# Patient Record
Sex: MALE | Race: WHITE | NOT HISPANIC OR LATINO | URBAN - METROPOLITAN AREA
[De-identification: names, ages, dates, MRNs, and addresses within clinical notes are randomized per-mention and may not be internally consistent; named-entity substitution may affect disease eponyms.]

---

## 2022-09-01 ENCOUNTER — INPATIENT (INPATIENT)
Facility: HOSPITAL | Age: 57
LOS: 0 days | Discharge: HOME | End: 2022-09-02
Attending: HOSPITALIST | Admitting: HOSPITALIST

## 2022-09-01 VITALS
TEMPERATURE: 99 F | HEART RATE: 84 BPM | SYSTOLIC BLOOD PRESSURE: 134 MMHG | HEIGHT: 67 IN | OXYGEN SATURATION: 99 % | RESPIRATION RATE: 18 BRPM | DIASTOLIC BLOOD PRESSURE: 77 MMHG | WEIGHT: 154.98 LBS

## 2022-09-01 LAB
ALBUMIN SERPL ELPH-MCNC: 4.8 G/DL — SIGNIFICANT CHANGE UP (ref 3.5–5.2)
ALP SERPL-CCNC: 54 U/L — SIGNIFICANT CHANGE UP (ref 30–115)
ALT FLD-CCNC: 11 U/L — SIGNIFICANT CHANGE UP (ref 0–41)
ANION GAP SERPL CALC-SCNC: 11 MMOL/L — SIGNIFICANT CHANGE UP (ref 7–14)
AST SERPL-CCNC: 21 U/L — SIGNIFICANT CHANGE UP (ref 0–41)
BASOPHILS # BLD AUTO: 0 K/UL — SIGNIFICANT CHANGE UP (ref 0–0.2)
BASOPHILS NFR BLD AUTO: 0 % — SIGNIFICANT CHANGE UP (ref 0–1)
BILIRUB DIRECT SERPL-MCNC: 0.2 MG/DL — SIGNIFICANT CHANGE UP (ref 0–0.3)
BILIRUB INDIRECT FLD-MCNC: 4.2 MG/DL — HIGH (ref 0.2–1.2)
BILIRUB SERPL-MCNC: 4.4 MG/DL — HIGH (ref 0.2–1.2)
BUN SERPL-MCNC: 17 MG/DL — SIGNIFICANT CHANGE UP (ref 10–20)
BURR CELLS BLD QL SMEAR: PRESENT — SIGNIFICANT CHANGE UP
CALCIUM SERPL-MCNC: 9.5 MG/DL — SIGNIFICANT CHANGE UP (ref 8.5–10.1)
CHLORIDE SERPL-SCNC: 103 MMOL/L — SIGNIFICANT CHANGE UP (ref 98–110)
CO2 SERPL-SCNC: 26 MMOL/L — SIGNIFICANT CHANGE UP (ref 17–32)
CREAT SERPL-MCNC: 1.1 MG/DL — SIGNIFICANT CHANGE UP (ref 0.7–1.5)
DACRYOCYTES BLD QL SMEAR: SLIGHT — SIGNIFICANT CHANGE UP
EGFR: 79 ML/MIN/1.73M2 — SIGNIFICANT CHANGE UP
EOSINOPHIL # BLD AUTO: 0 K/UL — SIGNIFICANT CHANGE UP (ref 0–0.7)
EOSINOPHIL NFR BLD AUTO: 0 % — SIGNIFICANT CHANGE UP (ref 0–8)
GLUCOSE SERPL-MCNC: 117 MG/DL — HIGH (ref 70–99)
HCT VFR BLD CALC: 45.8 % — SIGNIFICANT CHANGE UP (ref 42–52)
HGB BLD-MCNC: 16 G/DL — SIGNIFICANT CHANGE UP (ref 14–18)
LIDOCAIN IGE QN: 19 U/L — SIGNIFICANT CHANGE UP (ref 7–60)
LYMPHOCYTES # BLD AUTO: 0.54 K/UL — LOW (ref 1.2–3.4)
LYMPHOCYTES # BLD AUTO: 4.3 % — LOW (ref 20.5–51.1)
MAGNESIUM SERPL-MCNC: 1.9 MG/DL — SIGNIFICANT CHANGE UP (ref 1.8–2.4)
MANUAL SMEAR VERIFICATION: SIGNIFICANT CHANGE UP
MCHC RBC-ENTMCNC: 30.6 PG — SIGNIFICANT CHANGE UP (ref 27–31)
MCHC RBC-ENTMCNC: 34.9 G/DL — SIGNIFICANT CHANGE UP (ref 32–37)
MCV RBC AUTO: 87.6 FL — SIGNIFICANT CHANGE UP (ref 80–94)
MICROCYTES BLD QL: SLIGHT — SIGNIFICANT CHANGE UP
MONOCYTES # BLD AUTO: 0.88 K/UL — HIGH (ref 0.1–0.6)
MONOCYTES NFR BLD AUTO: 7 % — SIGNIFICANT CHANGE UP (ref 1.7–9.3)
NEUTROPHILS # BLD AUTO: 11.21 K/UL — HIGH (ref 1.4–6.5)
NEUTROPHILS NFR BLD AUTO: 81.7 % — HIGH (ref 42.2–75.2)
NEUTS BAND # BLD: 7 % — HIGH (ref 0–6)
OVALOCYTES BLD QL SMEAR: SLIGHT — SIGNIFICANT CHANGE UP
PLAT MORPH BLD: ABNORMAL
PLATELET # BLD AUTO: 237 K/UL — SIGNIFICANT CHANGE UP (ref 130–400)
POIKILOCYTOSIS BLD QL AUTO: SLIGHT — SIGNIFICANT CHANGE UP
POLYCHROMASIA BLD QL SMEAR: SLIGHT — SIGNIFICANT CHANGE UP
POTASSIUM SERPL-MCNC: 4.7 MMOL/L — SIGNIFICANT CHANGE UP (ref 3.5–5)
POTASSIUM SERPL-SCNC: 4.7 MMOL/L — SIGNIFICANT CHANGE UP (ref 3.5–5)
PROT SERPL-MCNC: 7.1 G/DL — SIGNIFICANT CHANGE UP (ref 6–8)
RBC # BLD: 5.23 M/UL — SIGNIFICANT CHANGE UP (ref 4.7–6.1)
RBC # FLD: 12.2 % — SIGNIFICANT CHANGE UP (ref 11.5–14.5)
RBC BLD AUTO: ABNORMAL
SODIUM SERPL-SCNC: 140 MMOL/L — SIGNIFICANT CHANGE UP (ref 135–146)
TROPONIN T SERPL-MCNC: <0.01 NG/ML — SIGNIFICANT CHANGE UP
WBC # BLD: 12.64 K/UL — HIGH (ref 4.8–10.8)
WBC # FLD AUTO: 12.64 K/UL — HIGH (ref 4.8–10.8)

## 2022-09-01 PROCEDURE — 99285 EMERGENCY DEPT VISIT HI MDM: CPT

## 2022-09-01 PROCEDURE — 93010 ELECTROCARDIOGRAM REPORT: CPT

## 2022-09-01 RX ORDER — SODIUM CHLORIDE 9 MG/ML
1000 INJECTION INTRAMUSCULAR; INTRAVENOUS; SUBCUTANEOUS ONCE
Refills: 0 | Status: COMPLETED | OUTPATIENT
Start: 2022-09-01 | End: 2022-09-01

## 2022-09-01 RX ADMIN — SODIUM CHLORIDE 1000 MILLILITER(S): 9 INJECTION INTRAMUSCULAR; INTRAVENOUS; SUBCUTANEOUS at 20:25

## 2022-09-01 NOTE — ED PROVIDER NOTE - OBJECTIVE STATEMENT
57 y.o. ,M, denies pmh here for eval of episode of nausea, abdominal cramping, lightheadedness / diaphoresis from 2-3 pm. [place] [facility] [place] covid test neg. Reports eating a possibly spoiled watermelon and having coffee aqt 11 am prior to pupil dialation  at 1pm. Denies hx of smoking, cp sob, palpitations, visual changes or paresthesias. Pt has been asxs since 3pm.

## 2022-09-01 NOTE — ED PROVIDER NOTE - PHYSICAL EXAMINATION
Physical Exam    Vital Signs: I have reviewed the initial vital signs.  Constitutional: well-nourished, appears stated age, no acute distress  Eyes: Conjunctiva pink, Sclera clear,   Cardiovascular: S1 and S2, regular rate, regular rhythm, well-perfused extremities, radial pulses equal and 2+, calves nonttp, equal in size  Respiratory: unlabored respiratory effort, speaking in full sentences, handling oral secretions,  clear to auscultation bilaterally no wheezing, rales and rhonchi  Gastrointestinal: soft, non-tender abdomen, no pulsatile mass, normal bowl sounds  Musculoskeletal: supple neck, no lower extremity edema, no midline tenderness, paraspinal tenderness, clavicular creptius, painful rom, moving all extreities appropriately, no gross bony deformities or swelling.  Integumentary: warm, dry, no rashes, lacerations,  Neurologic: awake, alert, cranial nerves II-XII grossly intact, extremities’ motor and sensory functions grossly intact, no nystagmus, tremors, fasciculations facial droop, no ataxia, no dysmetria

## 2022-09-01 NOTE — ED PROVIDER NOTE - CLINICAL SUMMARY MEDICAL DECISION MAKING FREE TEXT BOX
ed w/u showing leukocytosis w/ elevated bands. indirect bili also elevated. ruq sono w/o acute findings. will admit for further w/u, monitoring, treatment. pt clinically stable, nontoxic appearing.

## 2022-09-01 NOTE — ED PROVIDER NOTE - NS ED ATTENDING STATEMENT MOD
This was a shared visit with the SANCHO. I reviewed and verified the documentation and independently performed the documented:

## 2022-09-01 NOTE — ED PROVIDER NOTE - CARE PLAN
Principal Discharge DX:	Bandemia without diagnosis of specific infection  Secondary Diagnosis:	Total bilirubin, elevated   1

## 2022-09-01 NOTE — ED PROVIDER NOTE - INPATIENT RESIDENT/ACP NOTIFIED
[facility] Emergency Dept  [address] [address]  Phone: [phone]    Name:  Jennie L Clark Behr  Current Date: May 17, 2017  : 1977  MRN: [id number]   BHARAT: [id number]    Visit Date: 2017  Address: [address] [address]  Phone: [phone]    Primary Care Provider     Name: Wilfredo Soni MD    Phone: [phone]    The staff of [facility] would like to thank you for allowing us to assist you with your healthcare needs. The following includes patient education materials and information on how best to care for your illness/injury at home and when to see a physician. If you need to locate a Doctor or clinic close to you, please call the Doctor Referral Service at [phone]. The Service is available Monday through Thursday from 8 AM to 8 PM and  from 8 AM to 4 PM.    Patients Please Note: If further time off is required, or a medical clearance to return to work is required, it must be obtained through your primary physician.  Return to work clearances and extensions of \"Time-Off\" will not be given by the Emergency Department.     We hope that you leave our Emergency Department believing that we provided you with very good care.   Your Opinion Matters To Us  If you receive a patient satisfaction survey in the mail, please complete and return it in the postage-paid envelope.  We truly value and appreciate your feedback.  Emergency Department Care Providers   Physician:Gerard Eugene DO    Advanced Practice Provider:  No providers found     RN_________________ ED Tech__________________ Clerical_________________         MAR

## 2022-09-01 NOTE — ED ADULT TRIAGE NOTE - CHIEF COMPLAINT QUOTE
Patient complaining of dizziness, nausea, and diaphoresis after having eyes dilated at opthomologist office. Symptoms have since resolved.

## 2022-09-01 NOTE — ED PROVIDER NOTE - ATTENDING APP SHARED VISIT CONTRIBUTION OF CARE
56 yo m   pt presents for nausea, lightheadedness, wretching, lower abd cramping around 2-3pm.  pt states he ate bad food in the morning. sx resolved after 3pm. pt went to , covid test negative.  of note, pt went to optho today at 1pm, had pupils dilated but was doing well immediately after optho eval 56 yo m   pt presents for nausea, lightheadedness, wretching, lower abd cramping around 2-3pm.  pt states he ate bad food in the morning. sx resolved after 3pm. pt went to , covid test negative.  of note, pt went to optho today at 1pm, had pupils dilated but was doing well after optho eval and made it home OK. pt denies smoking hx, fhx early cad. pt states he works out regularly and denies change in exertional tolerance, CP on exertion, lightheadedness on exertion, SOB on exertion.     vss  gen- NAD, aaox3  card-rrr  lungs-ctab, no wheezing or rhonchi  abd-sntnd, no guarding or rebound  neuro- full str/sensation, cn ii-xii grossly intact, normal coordination and gait      well appearing, likely related to bad food, however, will get screening labs, ekg/trop for atypical acs r/o  pt low risk w/o specific cardiac rf and denies anginal equivalents

## 2022-09-02 VITALS
SYSTOLIC BLOOD PRESSURE: 113 MMHG | TEMPERATURE: 96 F | DIASTOLIC BLOOD PRESSURE: 72 MMHG | RESPIRATION RATE: 18 BRPM | HEART RATE: 73 BPM

## 2022-09-02 LAB
ALBUMIN SERPL ELPH-MCNC: 4.5 G/DL — SIGNIFICANT CHANGE UP (ref 3.5–5.2)
ALP SERPL-CCNC: 44 U/L — SIGNIFICANT CHANGE UP (ref 30–115)
ALT FLD-CCNC: 10 U/L — SIGNIFICANT CHANGE UP (ref 0–41)
ANION GAP SERPL CALC-SCNC: 7 MMOL/L — SIGNIFICANT CHANGE UP (ref 7–14)
APPEARANCE UR: CLEAR — SIGNIFICANT CHANGE UP
AST SERPL-CCNC: 19 U/L — SIGNIFICANT CHANGE UP (ref 0–41)
BASOPHILS # BLD AUTO: 0.03 K/UL — SIGNIFICANT CHANGE UP (ref 0–0.2)
BASOPHILS NFR BLD AUTO: 0.4 % — SIGNIFICANT CHANGE UP (ref 0–1)
BILIRUB DIRECT SERPL-MCNC: 0.2 MG/DL — SIGNIFICANT CHANGE UP (ref 0–0.3)
BILIRUB INDIRECT FLD-MCNC: 6 MG/DL — HIGH (ref 0.2–1.2)
BILIRUB SERPL-MCNC: 6.1 MG/DL — HIGH (ref 0.2–1.2)
BILIRUB SERPL-MCNC: 6.2 MG/DL — HIGH (ref 0.2–1.2)
BILIRUB UR-MCNC: NEGATIVE — SIGNIFICANT CHANGE UP
BUN SERPL-MCNC: 13 MG/DL — SIGNIFICANT CHANGE UP (ref 10–20)
CALCIUM SERPL-MCNC: 9.2 MG/DL — SIGNIFICANT CHANGE UP (ref 8.5–10.1)
CHLORIDE SERPL-SCNC: 106 MMOL/L — SIGNIFICANT CHANGE UP (ref 98–110)
CO2 SERPL-SCNC: 28 MMOL/L — SIGNIFICANT CHANGE UP (ref 17–32)
COLOR SPEC: SIGNIFICANT CHANGE UP
CREAT SERPL-MCNC: 1 MG/DL — SIGNIFICANT CHANGE UP (ref 0.7–1.5)
DIFF PNL FLD: NEGATIVE — SIGNIFICANT CHANGE UP
EGFR: 88 ML/MIN/1.73M2 — SIGNIFICANT CHANGE UP
EOSINOPHIL # BLD AUTO: 0.01 K/UL — SIGNIFICANT CHANGE UP (ref 0–0.7)
EOSINOPHIL NFR BLD AUTO: 0.1 % — SIGNIFICANT CHANGE UP (ref 0–8)
GLUCOSE SERPL-MCNC: 107 MG/DL — HIGH (ref 70–99)
GLUCOSE UR QL: NEGATIVE — SIGNIFICANT CHANGE UP
HCT VFR BLD CALC: 41.7 % — LOW (ref 42–52)
HGB BLD-MCNC: 14.5 G/DL — SIGNIFICANT CHANGE UP (ref 14–18)
IMM GRANULOCYTES NFR BLD AUTO: 0.1 % — SIGNIFICANT CHANGE UP (ref 0.1–0.3)
KETONES UR-MCNC: NEGATIVE — SIGNIFICANT CHANGE UP
LDH SERPL L TO P-CCNC: 147 U/L — SIGNIFICANT CHANGE UP (ref 50–242)
LEUKOCYTE ESTERASE UR-ACNC: NEGATIVE — SIGNIFICANT CHANGE UP
LYMPHOCYTES # BLD AUTO: 1.18 K/UL — LOW (ref 1.2–3.4)
LYMPHOCYTES # BLD AUTO: 17.4 % — LOW (ref 20.5–51.1)
MAGNESIUM SERPL-MCNC: 2.1 MG/DL — SIGNIFICANT CHANGE UP (ref 1.8–2.4)
MCHC RBC-ENTMCNC: 30 PG — SIGNIFICANT CHANGE UP (ref 27–31)
MCHC RBC-ENTMCNC: 34.8 G/DL — SIGNIFICANT CHANGE UP (ref 32–37)
MCV RBC AUTO: 86.3 FL — SIGNIFICANT CHANGE UP (ref 80–94)
MONOCYTES # BLD AUTO: 0.59 K/UL — SIGNIFICANT CHANGE UP (ref 0.1–0.6)
MONOCYTES NFR BLD AUTO: 8.7 % — SIGNIFICANT CHANGE UP (ref 1.7–9.3)
NEUTROPHILS # BLD AUTO: 4.96 K/UL — SIGNIFICANT CHANGE UP (ref 1.4–6.5)
NEUTROPHILS NFR BLD AUTO: 73.3 % — SIGNIFICANT CHANGE UP (ref 42.2–75.2)
NITRITE UR-MCNC: NEGATIVE — SIGNIFICANT CHANGE UP
NRBC # BLD: 0 /100 WBCS — SIGNIFICANT CHANGE UP (ref 0–0)
PH UR: 6 — SIGNIFICANT CHANGE UP (ref 5–8)
PLATELET # BLD AUTO: 229 K/UL — SIGNIFICANT CHANGE UP (ref 130–400)
POTASSIUM SERPL-MCNC: 4.9 MMOL/L — SIGNIFICANT CHANGE UP (ref 3.5–5)
POTASSIUM SERPL-SCNC: 4.9 MMOL/L — SIGNIFICANT CHANGE UP (ref 3.5–5)
PROT SERPL-MCNC: 6.3 G/DL — SIGNIFICANT CHANGE UP (ref 6–8)
PROT UR-MCNC: NEGATIVE — SIGNIFICANT CHANGE UP
RBC # BLD: 4.83 M/UL — SIGNIFICANT CHANGE UP (ref 4.7–6.1)
RBC # BLD: 4.83 M/UL — SIGNIFICANT CHANGE UP (ref 4.7–6.1)
RBC # FLD: 12.2 % — SIGNIFICANT CHANGE UP (ref 11.5–14.5)
RETICS #: 52.2 K/UL — SIGNIFICANT CHANGE UP (ref 25–125)
RETICS/RBC NFR: 1.1 % — SIGNIFICANT CHANGE UP (ref 0.5–1.5)
SARS-COV-2 RNA SPEC QL NAA+PROBE: SIGNIFICANT CHANGE UP
SODIUM SERPL-SCNC: 141 MMOL/L — SIGNIFICANT CHANGE UP (ref 135–146)
SP GR SPEC: 1.02 — SIGNIFICANT CHANGE UP (ref 1.01–1.03)
UROBILINOGEN FLD QL: SIGNIFICANT CHANGE UP
WBC # BLD: 6.78 K/UL — SIGNIFICANT CHANGE UP (ref 4.8–10.8)
WBC # FLD AUTO: 6.78 K/UL — SIGNIFICANT CHANGE UP (ref 4.8–10.8)

## 2022-09-02 PROCEDURE — 71045 X-RAY EXAM CHEST 1 VIEW: CPT | Mod: 26

## 2022-09-02 PROCEDURE — 76705 ECHO EXAM OF ABDOMEN: CPT | Mod: 26

## 2022-09-02 PROCEDURE — 99222 1ST HOSP IP/OBS MODERATE 55: CPT

## 2022-09-02 RX ORDER — ENOXAPARIN SODIUM 100 MG/ML
40 INJECTION SUBCUTANEOUS EVERY 24 HOURS
Refills: 0 | Status: DISCONTINUED | OUTPATIENT
Start: 2022-09-02 | End: 2022-09-02

## 2022-09-02 RX ORDER — ACETAMINOPHEN 500 MG
650 TABLET ORAL ONCE
Refills: 0 | Status: COMPLETED | OUTPATIENT
Start: 2022-09-02 | End: 2022-09-02

## 2022-09-02 RX ORDER — INFLUENZA VIRUS VACCINE 15; 15; 15; 15 UG/.5ML; UG/.5ML; UG/.5ML; UG/.5ML
0.5 SUSPENSION INTRAMUSCULAR ONCE
Refills: 0 | Status: COMPLETED | OUTPATIENT
Start: 2022-09-02 | End: 2022-09-02

## 2022-09-02 RX ADMIN — Medication 650 MILLIGRAM(S): at 09:30

## 2022-09-02 RX ADMIN — Medication 650 MILLIGRAM(S): at 08:58

## 2022-09-02 NOTE — H&P ADULT - NSHPPHYSICALEXAM_GEN_ALL_CORE
CONSTITUTIONAL: No acute distress, well-developed, well-groomed, AAOx3  HEAD: Atraumatic, normocephalic  ENT: no JVD; moist mucous membranes  PULMONARY: Clear to auscultation bilaterally; no wheezes  CARDIOVASCULAR: Regular rate and rhythm; no murmurs  GASTROINTESTINAL: Soft, non-tender, non-distended  MUSCULOSKELETAL:  no edema  NEUROLOGY: non-focal  SKIN: No rashes or lesions

## 2022-09-02 NOTE — DISCHARGE NOTE NURSING/CASE MANAGEMENT/SOCIAL WORK - PATIENT PORTAL LINK FT
You can access the FollowMyHealth Patient Portal offered by [facility] by registering at the following website: [email or web address]. By joining [employer]’s FollowMyHealth portal, you will also be able to view your health information using other applications (apps) compatible with our system.

## 2022-09-02 NOTE — PATIENT PROFILE ADULT - FALL HARM RISK - UNIVERSAL INTERVENTIONS
Bed in lowest position, wheels locked, appropriate side rails in place/Call bell, personal items and telephone in reach/Instruct patient to call for assistance before getting out of bed or chair/Non-slip footwear when patient is out of bed/[place] to call system/Physically safe environment - no spills, clutter or unnecessary equipment/Purposeful Proactive Rounding/Room/bathroom lighting operational, light cord in reach

## 2022-09-02 NOTE — PROGRESS NOTE ADULT - ASSESSMENT
#Misc  - DVT Prophylaxis: lovenox  - GI Prophylaxis: n/a  - Diet: regular   - Activity: IAT   - IV Fluids: n/a  - Code Status: full code   - Dispo: admit to medicine      Patient is a 57 year old male with no past medical history presents to the ED with complaint of dizziness, nausea, abdominal cramping and diaphoresis. The patient states he had an eye exam on the morning of the admission where his eyes were dilated. One hour after this upon returning home from the eye exam he notes dizziness, nausea, abdominal cramping, and diaphoresis. The symptoms self resolved with out any intervention and patient reported to the ED for evaluation.  Patient contributes these symptoms to his eye dilation. He denies vomiting, diarrhea, constipation, syncope, LOC, confusion, blurry vision, chest pain, shortness of breath, or abdominal pain.     # Abdominal pain, nausea, light headiness  - US of abdomen: no acute pathology  - Liver enzymes within normal range  - WBC 12.64 on admission ---> repeated WBC: 6.78    #Billirubin Total elevated 4,4/ indirect 4.2  - most probably Gilbert syndrome, given normal Hb   - repeated Billirubin total 6.2 indirect 6.0    #Misc  - DVT Prophylaxis: lovenox  - GI Prophylaxis: n/a  - Diet: regular   - Activity: IAT   - IV Fluids: n/a  - Code Status: full code

## 2022-09-02 NOTE — PROGRESS NOTE ADULT - SUBJECTIVE AND OBJECTIVE BOX
ASHLIE CAMPOVERDE 57y Male  MRN#: [id number]   Hospital Day:     Patient is a 57 year old male with no past medical history presents to the ED with complaint of nausea, vomitting, abd57 y.o. ,M, denies pmh here for eval of episode of nausea, abdominal cramping, lightheadedness / diaphoresis from 2-3 pm. [place] [facility] [place] covid test neg. Reports eating a possibly spoiled watermelon and having coffee aqt 11 am prior to pupil dialation  at 1pm. Denies hx of smoking, cp sob, palpitations, visual changes or paresthesias. Pt has been asxs since 3pm.        SUBJECTIVE  Patient is a 57y old Male who presents with a chief complaint of Currently admitted to medicine with the primary diagnosis of Bandemia without diagnosis of specific infection      INTERVAL HPI AND OVERNIGHT EVENTS:  Patient was examined and seen at bedside. This morning he is resting comfortably in bed and reports no issues or overnight events.    OBJECTIVE  PAST MEDICAL & SURGICAL HISTORY    ALLERGIES:  No Known Allergies    MEDICATIONS:  STANDING MEDICATIONS  enoxaparin Injectable 40 milliGRAM(s) SubCutaneous every 24 hours  influenza   Vaccine 0.5 milliLiter(s) IntraMuscular once    PRN MEDICATIONS      VITAL SIGNS: Last 24 Hours  T(C): 36.6 (02 Sep 2022 05:07), Max: 37.4 (01 Sep 2022 19:29)  T(F): 97.8 (02 Sep 2022 05:07), Max: 99.4 (01 Sep 2022 19:29)  HR: 73 (02 Sep 2022 05:07) (73 - 84)  BP: 141/78 (02 Sep 2022 05:07) (122/67 - 141/78)  BP(mean): --  RR: 18 (02 Sep 2022 05:07) (18 - 18)  SpO2: 98% (02 Sep 2022 04:30) (98% - 99%)    LABS:                        16.0   12.64 )-----------( 237      ( 01 Sep 2022 20:25 )             45.8     09-    140  |  103  |  17  ----------------------------<  117<H>  4.7   |  26  |  1.1    Ca    9.5      01 Sep 2022 20:25  Mg     1.9         TPro  7.1  /  Alb  4.8  /  TBili  4.4<H>  /  DBili  0.2  /  AST  21  /  ALT  11  /  AlkPhos  54        Urinalysis Basic - ( 02 Sep 2022 00:51 )    Color: Light Yellow / Appearance: Clear / S.016 / pH: x  Gluc: x / Ketone: Negative  / Bili: Negative / Urobili: <2 mg/dL   Blood: x / Protein: Negative / Nitrite: Negative   Leuk Esterase: Negative / RBC: x / WBC x   Sq Epi: x / Non Sq Epi: x / Bacteria: x        Troponin T, Serum: <0.01 ng/mL (22 @ 21:13)      CARDIAC MARKERS ( 01 Sep 2022 21:13 )  x     / <0.01 ng/mL / x     / x     / x          RADIOLOGY:      PHYSICAL EXAM:    CONSTITUTIONAL: No acute distress, well-developed, well-groomed, AAOx3  HEAD: Atraumatic, normocephalic  ENT: no JVD; moist mucous membranes  PULMONARY: Clear to auscultation bilaterally; no wheezes  CARDIOVASCULAR: Regular rate and rhythm; no murmurs  GASTROINTESTINAL: Soft, non-tender, non-distended  MUSCULOSKELETAL:  no edema  NEUROLOGY: non-focal  SKIN: No rashes or lesions   ASHLIE CAMPOVERDE 57y Male  MRN#: [id number]   Hospital Day:     Patient is a 57 year old male with no past medical history presents to the ED with complaint of dizziness, nausea, abdominal cramping and diaphoresis. The patient states he had an eye exam on the morning of the admission where his eyes were dilated. One hour after this upon returning home from the eye exam he notes dizziness, nausea, abdominal cramping, and diaphoresis. The symptoms self resolved with out any intervention and patient reported to the ED for evaluation.  Patient contributes these symptoms to his eye dilation. He denies vomiting, diarrhea, constipation, syncope, LOC, confusion, blurry vision, chest pain, shortness of breath, or abdominal pain. In the ED, the patient was HD stable. Labs noting Total Bili 4.4 with Unconjugated bilirubin of 4.2.  US Gallbladder negative for bilary tract obstruction. Patient denies any family history of liver disorder. Denies alcohol or illicit drug use. He has not been to a doctor in "many years" and does not know of any prior lab results. UA negative. The patient is to be admitted to medicine for further investigation of hyperbilirubinemia.            SUBJECTIVE  Patient is a 57y old Male who presents with a chief complaint of Currently admitted to medicine with the primary diagnosis of Bandemia without diagnosis of specific infection      INTERVAL HPI AND OVERNIGHT EVENTS:  Patient was examined and seen at bedside. This morning he is resting comfortably in bed and reports no issues or overnight events.    OBJECTIVE  PAST MEDICAL & SURGICAL HISTORY    ALLERGIES:  No Known Allergies    MEDICATIONS:  STANDING MEDICATIONS  enoxaparin Injectable 40 milliGRAM(s) SubCutaneous every 24 hours  influenza   Vaccine 0.5 milliLiter(s) IntraMuscular once    PRN MEDICATIONS      VITAL SIGNS: Last 24 Hours  T(C): 36.6 (02 Sep 2022 05:07), Max: 37.4 (01 Sep 2022 19:29)  T(F): 97.8 (02 Sep 2022 05:07), Max: 99.4 (01 Sep 2022 19:29)  HR: 73 (02 Sep 2022 05:07) (73 - 84)  BP: 141/78 (02 Sep 2022 05:07) (122/67 - 141/78)  BP(mean): --  RR: 18 (02 Sep 2022 05:07) (18 - 18)  SpO2: 98% (02 Sep 2022 04:30) (98% - 99%)    LABS:                        16.0   12.64 )-----------( 237      ( 01 Sep 2022 20:25 )             45.8         140  |  103  |  17  ----------------------------<  117<H>  4.7   |  26  |  1.1    Ca    9.5      01 Sep 2022 20:25  Mg     1.9         TPro  7.1  /  Alb  4.8  /  TBili  4.4<H>  /  DBili  0.2  /  AST  21  /  ALT  11  /  AlkPhos  54        Urinalysis Basic - ( 02 Sep 2022 00:51 )    Color: Light Yellow / Appearance: Clear / S.016 / pH: x  Gluc: x / Ketone: Negative  / Bili: Negative / Urobili: <2 mg/dL   Blood: x / Protein: Negative / Nitrite: Negative   Leuk Esterase: Negative / RBC: x / WBC x   Sq Epi: x / Non Sq Epi: x / Bacteria: x        Troponin T, Serum: <0.01 ng/mL (22 @ 21:13)      CARDIAC MARKERS ( 01 Sep 2022 21:13 )  x     / <0.01 ng/mL / x     / x     / x          RADIOLOGY:      PHYSICAL EXAM:    CONSTITUTIONAL: No acute distress, well-developed, well-groomed, AAOx3  HEAD: Atraumatic, normocephalic  ENT: no JVD; moist mucous membranes  PULMONARY: Clear to auscultation bilaterally; no wheezes  CARDIOVASCULAR: Regular rate and rhythm; no murmurs  GASTROINTESTINAL: Soft, non-tender, non-distended  MUSCULOSKELETAL:  no edema  NEUROLOGY: non-focal  SKIN: No rashes or lesions

## 2022-09-02 NOTE — H&P ADULT - NSHPLABSRESULTS_GEN_ALL_CORE
VITAL SIGNS: Last 24 Hours  T(C): 37.3 (02 Sep 2022 04:30), Max: 37.4 (01 Sep 2022 19:29)  T(F): 99.2 (02 Sep 2022 04:30), Max: 99.4 (01 Sep 2022 19:29)  HR: 75 (02 Sep 2022 04:30) (75 - 84)  BP: 122/67 (02 Sep 2022 04:30) (122/67 - 134/77)  BP(mean): --  RR: 18 (02 Sep 2022 04:30) (18 - 18)  SpO2: 98% (02 Sep 2022 04:30) (98% - 99%)    LABS:                        16.0   12.64 )-----------( 237      ( 01 Sep 2022 20:25 )             45.8     09    140  |  103  |  17  ----------------------------<  117<H>  4.7   |  26  |  1.1    Ca    9.5      01 Sep 2022 20:25  Mg     1.9         TPro  7.1  /  Alb  4.8  /  TBili  4.4<H>  /  DBili  0.2  /  AST  21  /  ALT  11  /  AlkPhos  54        Urinalysis Basic - ( 02 Sep 2022 00:51 )    Color: Light Yellow / Appearance: Clear / S.016 / pH: x  Gluc: x / Ketone: Negative  / Bili: Negative / Urobili: <2 mg/dL   Blood: x / Protein: Negative / Nitrite: Negative   Leuk Esterase: Negative / RBC: x / WBC x   Sq Epi: x / Non Sq Epi: x / Bacteria: x    Troponin T, Serum: <0.01 ng/mL (22 @ 21:13)    CARDIAC MARKERS ( 01 Sep 2022 21:13 )  x     / <0.01 ng/mL / x     / x     / x

## 2022-09-02 NOTE — DISCHARGE NOTE NURSING/CASE MANAGEMENT/SOCIAL WORK - NSDCPEFALRISK_GEN_ALL_CORE
For information on Fall & Injury Prevention, visit: [email or web address] OR  [email or web address] OR  https://www.cdc.gov/steadi/patient.html

## 2022-09-02 NOTE — DISCHARGE NOTE PROVIDER - NSDCCPCAREPLAN_GEN_ALL_CORE_FT
PRINCIPAL DISCHARGE DIAGNOSIS  Diagnosis: Gastroenteritis  Assessment and Plan of Treatment: Gastroenteritis, or stomach flu, is an infection of the stomach and intestines. It is caused by bacteria, parasites, or viruses.  What increases my risk for gastroenteritis?  Close contact with an infected person or animal  Food poisoning, such as from eggs, raw vegetables, shellfish, or meat that is not fully cooked  Drinking water that is not clean, such as when you camp or travel  What are the signs and symptoms of gastroenteritis?  Diarrhea or gas  Nausea, vomiting, or poor appetite  Abdominal cramps, pain, or gurgling  Fever  Tiredness or weakness  Headaches or muscle aches with any of the above symptoms  When should I seek immediate care?  You see blood in your diarrhea.  You cannot stop vomiting.  You have not urinated for 12 hours.  You feel like you are going to faint.        SECONDARY DISCHARGE DIAGNOSES  Diagnosis: Total bilirubin, elevated  Assessment and Plan of Treatment: This could be caused by GILBERT syndrome  Gilbert's  syndrome is a common, harmless liver condition in which the liver doesn't properly process bilirubin. Bilirubin is produced by the breakdown of red blood cells.  If you have Gilbert's syndrome — also known as constitutional hepatic dysfunction and familial nonhemolytic jaundice — you're born with the condition as a result of an inherited gene mutation. You might not know you have Gilbert's syndrome until it's discovered by accident, such as when a blood test shows elevated bilirubin levels.  Gilbert's syndrome requires no treatment.

## 2022-09-02 NOTE — H&P ADULT - ASSESSMENT
Patient is a 57 year old male with no past medical history presents to the ED with complaint of nausea, vomitting, abd57 y.o. ,M, denies pmh here for eval of episode of nausea, abdominal cramping, lightheadedness / diaphoresis from 2-3 pm. Pt sent from [facility] for eval, covid test neg. Reports eating a possibly spoiled watermelon and having coffee aqt 11 am prior to pupil dialation  at 1pm. Denies hx of smoking, cp sob, palpitations, visual changes or paresthesias. Pt has been asxs since 3pm.        #Misc  - DVT Prophylaxis: lovenox  - GI Prophylaxis: n/a  - Diet: regular   - Activity: IAT   - IV Fluids: n/a  - Code Status: full code   - Dispo: admit to medicine  Patient is a 57 year old male with no past medical history presents to the ED with complaint of dizziness, nausea, abdominal cramping and diaphoresis. The patient states he had an eye exam on the morning of the admission where his eyes were dilated. One hour after this upon returning home from the eye exam he notes dizziness, nausea, abdominal cramping, and diaphoresis. The symptoms self resolved with out any intervention and patient reported to the ED for evaluation.  Patient contributes these symptoms to his eye dilation. He denies vomiting, diarrhea, constipation, syncope, LOC, confusion, blurry vision, chest pain, shortness of breath, or abdominal pain. In the ED, the patient was HD stable. Labs noting Total Bili 4.4 with Unconjugated bilirubin of 4.2.  US Gallbladder negative for bilary tract obstruction. Patient denies any family history of liver disorder. Denies alcohol or illicit drug use. He has not been to a doctor in "many years" and does not know of any prior lab results. UA negative. The patient is to be admitted to medicine for further investigation of hyperbilirubinemia.      Hyperbilirubinemia  - repeat CMP   - given the lack of abdominal symptoms, could possible be contributed to [place] Disease?   - unlikely 2/2 to hemolysis given Hb of 16   - check LDH, Haptoglobin, reticulocyte count  - consider GI eval     Nausea/Abdominal cramping- resolved   - patient notes eating food that tasted spoiled > possible gastroenteritis  - supportive care   - trail of PO diet     #Misc  - DVT Prophylaxis: lovenox  - GI Prophylaxis: n/a  - Diet: regular   - Activity: IAT   - IV Fluids: n/a  - Code Status: full code   - Dispo: admit to medicine  Patient is a 57-year-old man with no past medical history presents to the ED with complaint of dizziness, nausea, abdominal cramping and diaphoresis.     # Nausea/Abdominal cramping likely 2/2 food poisoning from watermelon causing gastroenteritis  WBC was 12 w/ 7% bands, but today cbc is nl (perhaps was hemoconcentrated?)  supportive care   pt aziza diet now    # Hyperbilirubinemia - all indirect c/w Gilbert's Dz  Retic 1.1% and hgb nl = no hemolysis  GI not needed  RUQ U/S nl  no further w/u    # DVT Prophylaxis: lovenox    # GI Prophylaxis: n/a    # Activity: Independent     # Code Status: full code     Dispo: d/c home today

## 2022-09-02 NOTE — DISCHARGE NOTE PROVIDER - HOSPITAL COURSE
HPI:  Patient is a 57 year old male with no past medical history presents to the ED with complaint of dizziness, nausea, abdominal cramping and diaphoresis. The patient states he had an eye exam on the morning of the admission where his eyes were dilated. One hour after this upon returning home from the eye exam he notes dizziness, nausea, abdominal cramping, and diaphoresis. The symptoms self resolved with out any intervention and patient reported to the ED for evaluation.  Patient contributes these symptoms to his eye dilation. He denies vomiting, diarrhea, constipation, syncope, LOC, confusion, blurry vision, chest pain, shortness of breath, or abdominal pain. In the ED, the patient was HD stable. Labs noting Total Bili 4.4 with Unconjugated bilirubin of 4.2.  US Gallbladder negative for bilary tract obstruction. Patient denies any family history of liver disorder. Denies alcohol or illicit drug use. He has not been to a doctor in "many years" and does not know of any prior lab results. UA negative. The patient is to be admitted to medicine for further investigation of hyperbilirubinemia.  WBC=12    He was admitted for further assessment and management. US of the abdomen was done and showed no acute pathology. Repeat labs to monitor WBC trend was ordered.    Patient seen and examined at the bedside today. Patient clinically stable at this time. No longer requires acute in hospital level of care. Can be continually followed/managed as an outpatient. Please see progress note for further information including today's vitals and physical exam. HPI:  Patient is a 57 year old male with no past medical history presents to the ED with complaint of dizziness, nausea, abdominal cramping and diaphoresis. The patient states he had an eye exam on the morning of the admission where his eyes were dilated. One hour after this upon returning home from the eye exam he notes dizziness, nausea, abdominal cramping, and diaphoresis. The symptoms self resolved with out any intervention and patient reported to the ED for evaluation.  Patient contributes these symptoms to his eye dilation. He denies vomiting, diarrhea, constipation, syncope, LOC, confusion, blurry vision, chest pain, shortness of breath, or abdominal pain. In the ED, the patient was HD stable. Labs noting Total Bili 4.4 with Unconjugated bilirubin of 4.2.  US Gallbladder negative for bilary tract obstruction. Patient denies any family history of liver disorder. Denies alcohol or illicit drug use. He has not been to a doctor in "many years" and does not know of any prior lab results. UA negative. The patient is to be admitted to medicine for further investigation of hyperbilirubinemia.  WBC=12    He was admitted for further assessment and management. US of the abdomen was done and showed no acute pathology. Repeat labs to monitor WBC trend was ordered.WBC trending down from 12 to 6.     Patient seen and examined at the bedside today. Patient clinically stable at this time. No longer requires acute in hospital level of care. Can be continually followed/managed as an outpatient. Please see progress note for further information including today's vitals and physical exam.

## 2022-09-02 NOTE — H&P ADULT - HISTORY OF PRESENT ILLNESS
Patient is a 57 year old male with no past medical history presents to the ED with complaint of dizziness, nausea, abdominal cramping and diaphoresis. The patient states he had an eye exam on the morning of the admission where his eyes were dilated. One hour after this upon returning home from the eye exam he notes dizziness, nausea, abdominal cramping, and diaphoresis. The symptoms self resolved with out any intervention and patient reported to the ED for evaluation.  Patient contributes these symptoms to his eye dilation. He denies vomiting, diarrhea, constipation, syncope, LOC, confusion, blurry vision, chest pain, shortness of breath, or abdominal pain. In the ED, the patient was HD stable. Labs noting Total Bili 4.4 with Unconjugated bilirubin of 4.2.  US Gallbladder negative for bilary tract obstruction. Patient denies any family history of liver disorder. Denies alcohol or illicit drug use. He has not been to a doctor in "many years" and does not know of any prior lab results. UA negative. The patient is to be admitted to medicine for further investigation of hyperbilirubinemia.     Patient is a 57 year old male with no past medical history presents to the ED with complaint of dizziness, nausea, abdominal cramping and diaphoresis. The patient states he had an eye exam on the morning of the admission where his eyes were dilated. One hour after this upon returning home from the eye exam he notes dizziness, nausea, abdominal cramping, and diaphoresis. The symptoms self resolved with out any intervention and patient reported to the ED for evaluation.  Patient contributes these symptoms to his eye dilation. He denies vomiting, diarrhea, constipation, syncope, LOC, confusion, blurry vision, chest pain, shortness of breath, or abdominal pain. In the ED, the patient was HD stable. Labs noting Total Bili 4.4 with Unconjugated bilirubin of 4.2.  US Gallbladder negative for bilary tract obstruction. Patient denies any family history of liver disorder. Denies alcohol or illicit drug use. He has not been to a doctor in "many years" and does not know of any prior lab results. UA negative. The patient is to be admitted to medicine for further investigation of hyperbilirubinemia.      Attd: pt says he ate bitter-tasting watermelon and felt sick to his stomach; developed nausea, but never vomited; he had 1 episode of diarrhea (no blood or mucus); he had a lot of sweats and chills; however, today he is feeling great and ready to go home; reviewing him from head to toe today I could find nothing that would even hint towards and infection; the eye exam and dilation of pupils was routine; there was no glaucoma; there is no blurry vision or eye pain;

## 2022-09-03 LAB — HIV 1+2 AB+HIV1 P24 AG SERPL QL IA: SIGNIFICANT CHANGE UP

## 2022-09-07 DIAGNOSIS — K52.9 NONINFECTIVE GASTROENTERITIS AND COLITIS, UNSPECIFIED: ICD-10-CM

## 2022-09-07 DIAGNOSIS — Z20.822 CONTACT WITH AND (SUSPECTED) EXPOSURE TO COVID-19: ICD-10-CM

## 2022-09-07 DIAGNOSIS — A05.9 BACTERIAL FOODBORNE INTOXICATION, UNSPECIFIED: ICD-10-CM

## 2022-09-07 DIAGNOSIS — R42 DIZZINESS AND GIDDINESS: ICD-10-CM

## 2022-09-07 DIAGNOSIS — E80.4 GILBERT SYNDROME: ICD-10-CM

## 2022-09-07 LAB
CULTURE RESULTS: SIGNIFICANT CHANGE UP
CULTURE RESULTS: SIGNIFICANT CHANGE UP
SPECIMEN SOURCE: SIGNIFICANT CHANGE UP
SPECIMEN SOURCE: SIGNIFICANT CHANGE UP

## 2023-02-28 NOTE — PATIENT PROFILE ADULT - CHOOSE INDICATION TO IMMUNIZE (AN ORDER WILL BE GENERATED WHEN THIS NOTE IS SAVED):
02/28/23 9:36 AM     [employer] SmartForm used to document caregap status      Skinny Cantor Patient is not pregnant (male or female)

## 2023-07-17 NOTE — PATIENT PROFILE ADULT - CONTRAINDICATIONS & PRECAUTIONS (SELECT ALL THAT APPLY)
none... Scc Histology Text: There were aggregates and nests of atypical, pleomorphic squamous cells.  The area of positive cancer is as marked on the Mohs map. The area of positive cancer is as marked on the Mohs map.

## 2024-07-08 NOTE — ED ADULT TRIAGE NOTE - SPO2 (%)
Health Maintenance       COVID-19 Vaccine (4 - 2023-24 season)  Overdue since 9/1/2023    Medicare Advantage- Medicare Wellness Visit (Yearly - January to December)  Overdue since 1/1/2024           Following review of the above:  Patient is not proceeding with: COVID-19    Note: Refer to final orders and clinician documentation.       99

## 2025-05-29 ENCOUNTER — APPOINTMENT (OUTPATIENT)
Dept: ORTHOPEDIC SURGERY | Facility: CLINIC | Age: 60
End: 2025-05-29
Payer: COMMERCIAL

## 2025-05-29 DIAGNOSIS — M62.838 OTHER MUSCLE SPASM: ICD-10-CM

## 2025-05-29 PROBLEM — Z00.00 ENCOUNTER FOR PREVENTIVE HEALTH EXAMINATION: Status: ACTIVE | Noted: 2025-05-29

## 2025-05-29 PROCEDURE — 99203 OFFICE O/P NEW LOW 30 MIN: CPT

## 2025-05-29 PROCEDURE — 73502 X-RAY EXAM HIP UNI 2-3 VIEWS: CPT

## 2025-05-29 PROCEDURE — 72100 X-RAY EXAM L-S SPINE 2/3 VWS: CPT

## 2025-06-05 ENCOUNTER — APPOINTMENT (OUTPATIENT)
Dept: NEUROLOGY | Facility: CLINIC | Age: 60
End: 2025-06-05